# Patient Record
Sex: FEMALE | Race: ASIAN | URBAN - METROPOLITAN AREA
[De-identification: names, ages, dates, MRNs, and addresses within clinical notes are randomized per-mention and may not be internally consistent; named-entity substitution may affect disease eponyms.]

---

## 2021-05-04 ENCOUNTER — HISTORICAL (OUTPATIENT)
Dept: ADMINISTRATIVE | Facility: HOSPITAL | Age: 46
End: 2021-05-04

## 2021-05-04 LAB
ABS NEUT (OLG): 2.62 X10(3)/MCL (ref 2.1–9.2)
ALBUMIN SERPL-MCNC: 4.4 GM/DL (ref 3.5–5)
ALBUMIN/GLOB SERPL: 1.2 RATIO (ref 1.1–2)
ALP SERPL-CCNC: 54 UNIT/L (ref 40–150)
ALT SERPL-CCNC: 12 UNIT/L (ref 0–55)
APPEARANCE, UA: CLEAR
AST SERPL-CCNC: 20 UNIT/L (ref 5–34)
BACTERIA #/AREA URNS AUTO: ABNORMAL /HPF
BASOPHILS # BLD AUTO: 0 X10(3)/MCL (ref 0–0.2)
BASOPHILS NFR BLD AUTO: 0 %
BILIRUB SERPL-MCNC: 1.7 MG/DL
BILIRUB UR QL STRIP: NEGATIVE
BILIRUBIN DIRECT+TOT PNL SERPL-MCNC: 0.6 MG/DL (ref 0–0.5)
BILIRUBIN DIRECT+TOT PNL SERPL-MCNC: 1.1 MG/DL (ref 0–0.8)
BUN SERPL-MCNC: 11.7 MG/DL (ref 7–18.7)
CALCIUM SERPL-MCNC: 9.9 MG/DL (ref 8.4–10.2)
CHLORIDE SERPL-SCNC: 105 MMOL/L (ref 98–107)
CO2 SERPL-SCNC: 26 MMOL/L (ref 22–29)
COLOR UR: ABNORMAL
CREAT SERPL-MCNC: 0.7 MG/DL (ref 0.55–1.02)
EOSINOPHIL # BLD AUTO: 0.2 X10(3)/MCL (ref 0–0.9)
EOSINOPHIL NFR BLD AUTO: 4 %
ERYTHROCYTE [DISTWIDTH] IN BLOOD BY AUTOMATED COUNT: 13.2 % (ref 11.5–14.5)
EST. AVERAGE GLUCOSE BLD GHB EST-MCNC: 102.5 MG/DL
GLOBULIN SER-MCNC: 3.6 GM/DL (ref 2.4–3.5)
GLUCOSE (UA): NEGATIVE
GLUCOSE SERPL-MCNC: 94 MG/DL (ref 74–100)
HBA1C MFR BLD: 5.2 %
HCT VFR BLD AUTO: 40.3 % (ref 35–46)
HGB BLD-MCNC: 13.1 GM/DL (ref 12–16)
HGB UR QL STRIP: NEGATIVE
HYALINE CASTS #/AREA URNS LPF: ABNORMAL /LPF
IMM GRANULOCYTES # BLD AUTO: 0.02 10*3/UL
IMM GRANULOCYTES NFR BLD AUTO: 0 %
KETONES UR QL STRIP: NEGATIVE
LEUKOCYTE ESTERASE UR QL STRIP: NEGATIVE
LYMPHOCYTES # BLD AUTO: 2 X10(3)/MCL (ref 0.6–4.6)
LYMPHOCYTES NFR BLD AUTO: 36 %
MCH RBC QN AUTO: 29.6 PG (ref 26–34)
MCHC RBC AUTO-ENTMCNC: 32.5 GM/DL (ref 31–37)
MCV RBC AUTO: 91.2 FL (ref 80–100)
MONOCYTES # BLD AUTO: 0.6 X10(3)/MCL (ref 0.1–1.3)
MONOCYTES NFR BLD AUTO: 11 %
NEUTROPHILS # BLD AUTO: 2.62 X10(3)/MCL (ref 2.1–9.2)
NEUTROPHILS NFR BLD AUTO: 48 %
NITRITE UR QL STRIP: NEGATIVE
PH UR STRIP: 7 [PH] (ref 4.5–8)
PLATELET # BLD AUTO: 247 X10(3)/MCL (ref 130–400)
PMV BLD AUTO: 8.9 FL (ref 7.4–10.4)
POTASSIUM SERPL-SCNC: 4.3 MMOL/L (ref 3.5–5.1)
PROT SERPL-MCNC: 8 GM/DL (ref 6.4–8.3)
PROT UR QL STRIP: NEGATIVE
RBC # BLD AUTO: 4.42 X10(6)/MCL (ref 4–5.2)
RBC #/AREA URNS AUTO: ABNORMAL /HPF
SODIUM SERPL-SCNC: 138 MMOL/L (ref 136–145)
SP GR UR STRIP: 1 (ref 1–1.03)
SQUAMOUS #/AREA URNS LPF: ABNORMAL /LPF
T4 FREE SERPL-MCNC: 0.86 NG/DL (ref 0.7–1.48)
TSH SERPL-ACNC: 1.55 UIU/ML (ref 0.35–4.94)
UROBILINOGEN UR STRIP-ACNC: NORMAL
WBC # SPEC AUTO: 5.4 X10(3)/MCL (ref 4.5–11)
WBC #/AREA URNS AUTO: ABNORMAL /HPF

## 2021-05-26 ENCOUNTER — HISTORICAL (OUTPATIENT)
Dept: RADIOLOGY | Facility: HOSPITAL | Age: 46
End: 2021-05-26

## 2022-04-10 ENCOUNTER — HISTORICAL (OUTPATIENT)
Dept: ADMINISTRATIVE | Facility: HOSPITAL | Age: 47
End: 2022-04-10

## 2022-04-11 ENCOUNTER — HISTORICAL (OUTPATIENT)
Dept: ADMINISTRATIVE | Facility: HOSPITAL | Age: 47
End: 2022-04-11

## 2022-04-28 VITALS
WEIGHT: 120.81 LBS | HEIGHT: 66 IN | BODY MASS INDEX: 19.42 KG/M2 | SYSTOLIC BLOOD PRESSURE: 126 MMHG | DIASTOLIC BLOOD PRESSURE: 83 MMHG | OXYGEN SATURATION: 98 %

## 2022-04-28 VITALS
OXYGEN SATURATION: 98 % | BODY MASS INDEX: 19.42 KG/M2 | DIASTOLIC BLOOD PRESSURE: 83 MMHG | HEIGHT: 66 IN | WEIGHT: 120.81 LBS | SYSTOLIC BLOOD PRESSURE: 126 MMHG

## 2022-05-03 NOTE — HISTORICAL [facility] CERNER
This is a historical note converted from [employer]. Formatting and pictures may have been removed.  Please reference Tho for original formatting and attached multimedia. Chief Complaint  EST pcp, GYN  History of Present Illness  45 Years??Female?presents to [facility] to establish care?with PCP. ?PMH (per review of?available EMR?and confirmed with patient).  ?   Previous PCP: Denies  PmHx: Denies  SHx: C-sections x 3  FHx:? Father: Lung Cancer=smoker at 79, , Mother healthy alive at 74  Social Hx:  ???? 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 Nutrition: Regular 2?meals a day  ???? Caffeine intake? Large coffee once daily  ???? Alcohol intake??? Mostly wine on weekends  ???? Tobacco Intake?? Social smoker  ???? Illicit drug use? Denies  ???? Sexual history:? , no hx STD, sexually active  ???? Lives with?   ???? Daily Exercise?? Jogs regularly  ?   ; LMP: mid April-normal and regular, not on BCP.  Occasional left elbow and right knee pain that she manages on her own. Does not like taking medications. Occasional periodic back pain, hx of epidural once.?  ?   Cancer Screening:  Colonoscopy: never done  ?   Vaccines:  Tetanus/Tdap: UTD  States does not take any vaccines  Review of Systems  General: negative except as stated in hpi  Skin: negative except as stated in hpi  HEENT: negative except as stated in hpi  Respiratory: negative except as stated in hpi  CV: negative except as stated in hpi  GI: negative except as stated in hpi  : negative except as stated in hpi  MS: negative except as stated in hpi  Neuro: negative except as stated in hpi  Hematologic: negative except as stated in hpi  Endocrine: negative except as stated in hpi  Psych: negative except as stated in hpi  Physical Exam  Vitals & Measurements  T:?36.8? ?C (Oral)? HR:?62(Peripheral)? RR:?20? BP:?126/83? SpO2:?98%?  HT:?168.00?cm? WT:?54.800?kg? BMI:?19.42? LMP:?04/10/2021 00:00 CDT?  General: AAOx3; NADN.  Head: NC/AT  Eye: PERRLA;?EOMI;  Sclera/conjunctiva non-icteric. No nystagmus, lid lag, exophthalmos.  Ears/Nose/Throat:??TMs pearly gray bilat with no exudate/erythema. EACs without erythema/edema/exudate bilat. Nares patent. Turbinates without erythema/edema.? Oropharynx without erythema/exudate. uvula midline  Neck/Thyroid: Supple, Non-tender, No carotid bruit, No lymphadenopathy, FROM  Respiratory: Clear to auscultation bilaterally, Respirations non-labored, Breath sounds equal, Symmetrical chest wall expansion, No wheezes, rales or rhonchi.  Cardiovascular: Regular rate and rhythm, No murmurs, rubs or gallops. No peripheral edema.  Gastrointestinal: Soft, Non-tender, Non-distended, Normal bowel sounds. No organomegaly.  Musculoskeletal: Normal range of motion.? No clubbing, cyanosis or edema. FROM. Strength upper and lower ext 5/5.?  Integumentary: Warm, Dry, Intact, No suspicious lesions or rashes.?  Neurologic: No focal deficits,?Motor strength normal upper and lower extremities, Sensory exam intact.  Assessment/Plan  1.?Encounter for wellness examination?Z00.00  Labs today: CBC, CMP, TSH, Free T4, HgbA1c, UA  MMG ordered  Referral to GYN today.  RTC 3 months for routine FU and lab results.  2.?Tobacco user?Z72.0  Smoking cessation discussed.?  Pt not ready to quit.  Social smoker.  Discussed benefits of quitting including improved health, decreased cardiac/vascular/pulmonary/stroke risks as well as saving money.  Orders:  1160F- Medication reconciliation completed during visit, Encounter for well woman exam, [facility], 05/04/21 13:42:00 CDT  CBC w/ Auto Diff, Routine collect, 05/04/21 13:42:00 CDT, Blood, Order for future visit, Stop date 05/04/21 13:42:00 CDT, Lab Collect, Encounter for well woman exam, 05/04/21 13:42:00 CDT  Clinic Follow up, *Est. 08/04/21 3:00:00 CDT, Order for future visit, Encounter for well woman exam, [facility]  Comprehensive Metabolic Panel, Routine collect, 05/04/21 13:42:00 CDT, Blood, Order  for future visit, Stop date 21 13:42:00 CDT, Lab Collect, Encounter for well woman exam, 21 13:42:00 CDT  Free T4, Routine collect, 21 13:42:00 CDT, Blood, Order for future visit, Stop date 21 13:42:00 CDT, Lab Collect, Encounter for well woman exam, 21 13:42:00 CDT  Hemoglobin A1C [facility], Routine collect, 21 13:42:00 CDT, Blood, Order for future visit, Stop date 21 13:42:00 CDT, Lab Collect, Encounter for well woman exam, 21 13:42:00 CDT  MG Screening Bilateral, Routine, *Est. 21 3:00:00 CDT, Screening, screening, None, Ambulatory, Rad Type, Order for future visit, Encounter for well woman exam, Schedule this test, [facility], Follow Breast Imaging Order Set Protocol, *Est. 21...  Office/Outpatient Visit Level 4 [facility] [id number] PC, Encounter for well woman exam, [facility], 21 13:42:00 CDT  Thyroid Stimulating Hormone, Routine collect, 21 13:42:00 CDT, Blood, Order for future visit, Stop date 21 13:42:00 CDT, Lab Collect, Encounter for well woman exam, 21 13:42:00 CDT  Urinalysis with Microscopic if Indicated, Routine collect, Urine, Order for future visit, 21 13:42:00 CDT, Stop date 21 13:42:00 CDT, Nurse collect, Encounter for well woman exam  Referrals  [facility] Internal Referral to Gynecology Clinic, Specialty: Gynecology, Reason: annual wellness, Refer To: Qing DELAROSA, Mery Sood, [facility] , [facility], [place], [facility]., Start: 21 13:41:00 CDT  Clinic Follow up, *Est. 21 3:00:00 CDT, Order for future visit, Encounter for well woman exam, [facility]   Problem List/Past Medical History  Ongoing  Tobacco user  Historical  Pregnant  Pregnant  Pregnant  Procedure/Surgical History   delivery only; (2016)   Section (None) (2016)  Extraction of Products of Conception, Low Cervical, Open Approach (2016)    section x2   Medications  No active medications  Allergies  No Known Allergies  Social History  Abuse/Neglect  No, No, Yes, 05/04/2021  Alcohol  Current, Beer, Liquor, 1-2 times per month, 11/24/2018  Employment/School  Unemployed, 05/04/2021  Exercise  Exercise frequency: 1-2 times/week. Exercise type: Walking., 05/04/2021  Financial/Legal Situation  None, 05/04/2021  Home/Environment  Lives with Spouse. Living situation: Home/Independent., 05/04/2021    Never in , 05/04/2021  Nutrition/Health  Regular, Good, 05/04/2021  Sexual  Sexually active: Yes. Number of current partners 1. Sexual orientation: Straight or heterosexual. Gender Identity Identifies as female., 05/04/2021  Spiritual/Cultural  Congregation, 05/04/2021  Substance Use  Never, 06/23/2016  Tobacco  4 or less cigarettes(less than 1/4 pack)/day in last 30 days, Cigarettes, N/A, 05/04/2021  Family History  Diabetes mellitus type 2: Mother.  Hyperlipidemia.: Mother.  Primary malignant neoplasm of lung: Father.  Immunizations  Vaccine Date Status   tetanus/diphtheria/pertussis, acel(Tdap) 06/24/2016 Given   Health Maintenance  Health Maintenance  ???Pending?(in the next year)  ??? ??Refused?  ??? ? ? ?Smoking Cessation due??01/01/21??Variable frequency  ??? ??Due In Future?  ??? ? ? ?Obesity Screening not due until??01/01/22??and every 1??year(s)  ??? ? ? ?Alcohol Misuse Screening not due until??01/02/22??and every 1??year(s)  ???Satisfied?(in the past 1 year)  ??? ??Satisfied?  ??? ? ? ?ADL Screening on??05/04/21.??Satisfied by Milla Sher LPN  ??? ? ? ?Alcohol Misuse Screening on??05/04/21.??Satisfied by Milla Sher LPN  ??? ? ? ?Blood Pressure Screening on??05/04/21.??Satisfied by Milla Sher LPN  ??? ? ? ?Body Mass Index Check on??05/04/21.??Satisfied by Milla Sher LPN  ??? ? ? ?Depression Screening on??05/04/21.??Satisfied by Milla Sher LPN  ??? ? ? ?Obesity Screening on??05/04/21.??Satisfied by Christos RUVALCABA  Milla TERRAZAS  ??? ??Refused?  ??? ? ? ?Smoking Cessation on??05/04/21.??Recorded by Milla Sher LPN??Reason: Patient Refuses  ?